# Patient Record
Sex: FEMALE | Race: WHITE
[De-identification: names, ages, dates, MRNs, and addresses within clinical notes are randomized per-mention and may not be internally consistent; named-entity substitution may affect disease eponyms.]

---

## 2020-02-26 ENCOUNTER — HOSPITAL ENCOUNTER (EMERGENCY)
Dept: HOSPITAL 25 - UCCORT | Age: 25
Discharge: HOME | End: 2020-02-26
Payer: COMMERCIAL

## 2020-02-26 VITALS — SYSTOLIC BLOOD PRESSURE: 136 MMHG | DIASTOLIC BLOOD PRESSURE: 68 MMHG

## 2020-02-26 DIAGNOSIS — Z88.5: ICD-10-CM
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
## 2020-02-26 DIAGNOSIS — Z88.1: ICD-10-CM
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
## 2020-02-26 DIAGNOSIS — M25.561: Primary | ICD-10-CM

## 2020-02-26 PROCEDURE — 99201: CPT

## 2020-02-26 PROCEDURE — G0463 HOSPITAL OUTPT CLINIC VISIT: HCPCS

## 2020-02-26 NOTE — UC
Knee Pain HPI





- HPI Summary


HPI Summary: 


34-year-old woman comes in with chief complaint of right knee pain.  One week 

ago she slipped and fell on the ice and struck her anterior right knee.  She's 

been able to ambulate with pain.  She has put ice on it which does decrease the 

pain.  No complaint of any weakness or numbness or laxity.





- History of Current Complaint


Chief Complaint: UCLowerExtremity


Stated Complaint: RIGHT KNEE INJURY


Time Seen by Provider: 02/26/20 20:13


Pain Intensity: 1





- Allergies/Home Medications


Allergies/Adverse Reactions: 


 Allergies











Allergy/AdvReac Type Severity Reaction Status Date / Time


 


ceftriaxone [From Rocephin] Allergy  Vomiting Verified 02/26/20 19:45


 


tramadol Allergy  Vomiting Verified 02/26/20 19:45














PMH/Surg Hx/FS Hx/Imm Hx


Previously Healthy: Yes





- Surgical History


Surgical History: Yes


Surgery Procedure, Year, and Place: right arm reconstruction, hand surgery, 

floating rib placement,





- Family History


Known Family History: Positive: Non-Contributory





- Social History


Alcohol Use: Occasionally


Substance Use Type: None


Smoking Status (MU): Never Smoked Tobacco





Review of Systems


All Other Systems Reviewed And Are Negative: Yes


Constitutional: Positive: Negative


Skin: Positive: Negative


Eyes: Positive: Negative


ENT: Positive: Negative


Respiratory: Positive: Negative


Cardiovascular: Positive: Negative


Gastrointestinal: Positive: Negative


Motor: Positive: Negative


Neurovascular: Positive: Negative


Musculoskeletal: Positive: Other: - SEE HPI


Neurological/Mental Status: Positive: Negative


Psychological: Positive: Negative


Is Patient Immunocompromised?: No





Physical Exam


Triage Information Reviewed: Yes


Appearance: Well-Appearing, No Pain Distress, Well-Nourished


Vital Signs: 


 Initial Vital Signs











Temp  98.8 F   02/26/20 19:37


 


Pulse  100   02/26/20 19:37


 


Resp  18   02/26/20 19:37


 


BP  136/68   02/26/20 19:37


 


Pulse Ox  98   02/26/20 19:37











Vital Signs Reviewed: Yes


Eye Exam: Normal


Eyes: Positive: Conjunctiva Clear


Neck: Positive: Supple


Respiratory: Positive: No respiratory distress


Musculoskeletal: Positive: Other: - Right knee is tender to palpation over the 

patellotibial tendon.  Knee has full range of motion full-strength.  Nontender 

on the lateral and medial knee and posterior knee.  Stable to examination.  

Patient is able to flex and extend the knee.  Patella tibial tendon is intact 

to exam.


Neurological: Positive: Alert


Psychological: Positive: Age Appropriate Behavior


Skin Exam: Normal





Knee Pain Course/Dx





- Course


Course Of Treatment: 


I discussed the x-rays with the patient.  I did not see any fracture.  Final 

radiologist reading is pending.  Plan is ice anti-inflammatories and follow-up 

with sports medicine or orthopedics if not completely improved.





- Differential Dx/Diagnosis


Provider Diagnosis: 


 Right anterior knee pain








Discharge ED





- Sign-Out/Discharge


Documenting (check all that apply): Patient Departure


All imaging exams completed and their final reports reviewed: No





- Discharge Plan


Condition: Stable


Disposition: HOME


Patient Education Materials:  Knee Pain (ED)


Referrals: 


[facility] [Provider Group]


Eduard Ortiz MD [Medical Doctor] - 


Additional Instructions: 


FOLLOW UP WITH SPORTS MEDICINE OR ORTHOPEDICS IF NOT COMPLETELY IMPROVED.


GET REEVALUATED SOONER IF NOT IMPROVED OR WORSE OR ANY QUESTIONS OR CONCERNS.








- Billing Disposition and Condition


Condition: STABLE


Disposition: Home

## 2020-02-27 NOTE — [facility]
- Progress Note


Progress Note: 





wet read correct





Course/Dx





- Diagnoses


Provider Diagnoses: 


 Right anterior knee pain








Discharge ED





- Sign-Out/Discharge


Documenting (check all that apply): Post-Discharge Follow Up


All imaging exams completed and their final reports reviewed: Yes





- Discharge Plan


Condition: Stable


Disposition: HOME


Patient Education Materials:  Knee Pain (ED)


Referrals: 


[facility] [Provider Group]


Eduard Ortiz MD [Medical Doctor] - 


Additional Instructions: 


FOLLOW UP WITH SPORTS MEDICINE OR ORTHOPEDICS IF NOT COMPLETELY IMPROVED.


GET REEVALUATED SOONER IF NOT IMPROVED OR WORSE OR ANY QUESTIONS OR CONCERNS.








- Billing Disposition and Condition


Condition: STABLE


Disposition: Home